# Patient Record
Sex: FEMALE | Race: WHITE | NOT HISPANIC OR LATINO
[De-identification: names, ages, dates, MRNs, and addresses within clinical notes are randomized per-mention and may not be internally consistent; named-entity substitution may affect disease eponyms.]

---

## 2018-05-23 ENCOUNTER — TRANSCRIPTION ENCOUNTER (OUTPATIENT)
Age: 38
End: 2018-05-23

## 2019-01-29 ENCOUNTER — RESULT REVIEW (OUTPATIENT)
Age: 39
End: 2019-01-29

## 2019-02-24 PROBLEM — R92.8 ABNORMAL FINDING ON BREAST IMAGING: Status: ACTIVE | Noted: 2019-02-24

## 2019-02-25 ENCOUNTER — APPOINTMENT (OUTPATIENT)
Dept: SURGICAL ONCOLOGY | Facility: CLINIC | Age: 39
End: 2019-02-25
Payer: COMMERCIAL

## 2019-02-25 VITALS
BODY MASS INDEX: 22.18 KG/M2 | RESPIRATION RATE: 18 BRPM | HEART RATE: 90 BPM | SYSTOLIC BLOOD PRESSURE: 96 MMHG | TEMPERATURE: 98.5 F | WEIGHT: 110 LBS | DIASTOLIC BLOOD PRESSURE: 67 MMHG | HEIGHT: 59 IN

## 2019-02-25 DIAGNOSIS — R92.8 OTHER ABNORMAL AND INCONCLUSIVE FINDINGS ON DIAGNOSTIC IMAGING OF BREAST: ICD-10-CM

## 2019-02-25 PROCEDURE — 99243 OFF/OP CNSLTJ NEW/EST LOW 30: CPT

## 2020-06-25 ENCOUNTER — RESULT REVIEW (OUTPATIENT)
Age: 40
End: 2020-06-25

## 2020-10-19 NOTE — REASON FOR VISIT
[Initial Consultation] : an initial consultation for [Other: _____] : [unfilled] [FreeTextEntry2] : Equivocal baseline breast imaging

## 2020-10-19 NOTE — PHYSICAL EXAM
[Normal] : supple, no neck mass and thyroid not enlarged [Normal Neck Lymph Nodes] : normal neck lymph nodes  [Normal Supraclavicular Lymph Nodes] : normal supraclavicular lymph nodes [Normal Axillary Lymph Nodes] : normal axillary lymph nodes [Normal] : normal appearance, no rash, nodules, vesicles, ulcers, erythema [de-identified] : groins not examined [de-identified] : Below

## 2020-10-19 NOTE — HISTORY OF PRESENT ILLNESS
[de-identified] : 38-year-old lady being referred by her gynecologist Dr. Avila NAVARRETE with BILATERAL BREAST abnormalities on baseline mammography and sonography at [place], 2019: BI-RADS 3.\par \par Dr. Osmin Malik's neice\par \par Noted were bilateral microcalcifications, and nodules, for which 6 month followup mammogram and ultrasound have been recommended for 2019........................\par \par She is asymptomatic.\par \par She's had no previous breast biopsies\par \par No relatives with breast or ovarian cancer.\par \par No relatives with any history of malignancy.\par \par Menarche at 14.\par  4, para 2 the first at age 32.\par +BCP(Junel)\par \par + Ashkenazi\par \par Breast cancer risk score is 13.5\par \par \par , she was evaluated by Dr. Maxx Figueroa (hepatology) for a +hepatitis C antibody, this was felt to be a "likely soft positive", and at no clinical consequence.\par \par Her internist is Dr. Mike Ibarra.\par No anticoagulants.\par Oral contraceptives as above.\par Topamax for migraines.\par No other prescription medications.\par \par Her gynecologist is Dr. Avila Navarrete\par 2019 Pap smear was normal.\par \par Baseline colonoscopy will be at age 51

## 2020-10-19 NOTE — ASSESSMENT
[FreeTextEntry1] : We discussed the microcalcifications in both breasts, thought possibly to be "milk of calcium", and the nodule identified on screening sonography.\par While noted these were felt worrisome, since her were identified on her baseline studies, 6 month followup has been recommended.\par \par I retained her imaging from [place] and submitted for internal review.\par If concordant, she will have bilateral diagnostic mammography and sonography July 2019..(Below).\par \par Breast cancer risk score is not elevated\par \par Reviewed in detail, all questions answered.\par \par We should see her in approximately 6 months, sooner if needed.\par \par Note dictated\par \par 02/26/19:\par I spoke with Dr. Rider (breast imaging).\par She agrees with the recommended six-month followup bilateral mammography and sonography in July 2019. \par I spoke with the patient, and reviewed the above impression.\par I am mailing her a prescription for repeat studies in July at [facility].\par We will see her after that, sooner if needed\par ND\par \par \par \par 9-10-20:\par She had a right breast cyst aspiration at [facility], no malignant cells on cytology.\par Right breast (10:00) core biopsy: Fibrocystic change\par Left breast (4:00) core biopsy: Epithelial hyperplasia, fibrocystic change.\par Above studies were requested by her gynecologist, Dr. Avila Navarrete

## 2021-02-28 ENCOUNTER — TRANSCRIPTION ENCOUNTER (OUTPATIENT)
Age: 41
End: 2021-02-28

## 2021-06-29 ENCOUNTER — RX RENEWAL (OUTPATIENT)
Age: 41
End: 2021-06-29

## 2021-09-20 ENCOUNTER — RX RENEWAL (OUTPATIENT)
Age: 41
End: 2021-09-20

## 2021-09-21 ENCOUNTER — ASOB RESULT (OUTPATIENT)
Age: 41
End: 2021-09-21

## 2021-09-21 ENCOUNTER — APPOINTMENT (OUTPATIENT)
Dept: OBGYN | Facility: CLINIC | Age: 41
End: 2021-09-21
Payer: COMMERCIAL

## 2021-09-21 VITALS
DIASTOLIC BLOOD PRESSURE: 70 MMHG | SYSTOLIC BLOOD PRESSURE: 120 MMHG | BODY MASS INDEX: 25.2 KG/M2 | HEIGHT: 59 IN | WEIGHT: 125 LBS

## 2021-09-21 DIAGNOSIS — Z01.419 ENCOUNTER FOR GYNECOLOGICAL EXAMINATION (GENERAL) (ROUTINE) W/OUT ABNORMAL FINDINGS: ICD-10-CM

## 2021-09-21 PROCEDURE — 99395 PREV VISIT EST AGE 18-39: CPT

## 2021-09-21 PROCEDURE — 76830 TRANSVAGINAL US NON-OB: CPT

## 2021-09-21 RX ORDER — LEVONORGESTREL AND ETHINYL ESTRADIOL AND ETHINYL ESTRADIOL 150-30(84)
0.15-0.03 &0.01 KIT ORAL
Qty: 91 | Refills: 2 | Status: ACTIVE | COMMUNITY
Start: 2021-09-21 | End: 1900-01-01

## 2021-09-22 LAB — HPV HIGH+LOW RISK DNA PNL CVX: NOT DETECTED

## 2021-09-27 LAB — CYTOLOGY CVX/VAG DOC THIN PREP: NORMAL

## 2022-04-12 ENCOUNTER — TRANSCRIPTION ENCOUNTER (OUTPATIENT)
Age: 42
End: 2022-04-12

## 2022-09-22 ENCOUNTER — APPOINTMENT (OUTPATIENT)
Dept: OBGYN | Facility: CLINIC | Age: 42
End: 2022-09-22

## 2022-11-10 ENCOUNTER — APPOINTMENT (OUTPATIENT)
Dept: OBGYN | Facility: CLINIC | Age: 42
End: 2022-11-10

## 2022-11-10 ENCOUNTER — ASOB RESULT (OUTPATIENT)
Age: 42
End: 2022-11-10

## 2022-11-10 VITALS
SYSTOLIC BLOOD PRESSURE: 116 MMHG | HEIGHT: 59 IN | DIASTOLIC BLOOD PRESSURE: 79 MMHG | WEIGHT: 130 LBS | BODY MASS INDEX: 26.21 KG/M2

## 2022-11-10 DIAGNOSIS — Z30.019 ENCOUNTER FOR INITIAL PRESCRIPTION OF CONTRACEPTIVES, UNSPECIFIED: ICD-10-CM

## 2022-11-10 DIAGNOSIS — Z01.419 ENCOUNTER FOR GYNECOLOGICAL EXAMINATION (GENERAL) (ROUTINE) W/OUT ABNORMAL FINDINGS: ICD-10-CM

## 2022-11-10 PROCEDURE — 99396 PREV VISIT EST AGE 40-64: CPT

## 2022-11-10 PROCEDURE — 82270 OCCULT BLOOD FECES: CPT

## 2022-11-10 PROCEDURE — 76830 TRANSVAGINAL US NON-OB: CPT

## 2022-11-14 LAB — HPV HIGH+LOW RISK DNA PNL CVX: NOT DETECTED

## 2022-11-17 LAB — CYTOLOGY CVX/VAG DOC THIN PREP: NORMAL

## 2023-03-18 ENCOUNTER — NON-APPOINTMENT (OUTPATIENT)
Age: 43
End: 2023-03-18

## 2023-06-30 ENCOUNTER — NON-APPOINTMENT (OUTPATIENT)
Age: 43
End: 2023-06-30

## 2023-12-12 ENCOUNTER — APPOINTMENT (OUTPATIENT)
Dept: OBGYN | Facility: CLINIC | Age: 43
End: 2023-12-12
Payer: COMMERCIAL

## 2023-12-12 ENCOUNTER — ASOB RESULT (OUTPATIENT)
Age: 43
End: 2023-12-12

## 2023-12-12 VITALS
BODY MASS INDEX: 27.82 KG/M2 | HEIGHT: 59 IN | SYSTOLIC BLOOD PRESSURE: 113 MMHG | DIASTOLIC BLOOD PRESSURE: 70 MMHG | WEIGHT: 138 LBS

## 2023-12-12 DIAGNOSIS — Z01.419 ENCOUNTER FOR GYNECOLOGICAL EXAMINATION (GENERAL) (ROUTINE) W/OUT ABNORMAL FINDINGS: ICD-10-CM

## 2023-12-12 PROCEDURE — 99396 PREV VISIT EST AGE 40-64: CPT

## 2023-12-12 PROCEDURE — 82270 OCCULT BLOOD FECES: CPT

## 2023-12-13 LAB — HPV HIGH+LOW RISK DNA PNL CVX: NOT DETECTED

## 2023-12-18 LAB — CYTOLOGY CVX/VAG DOC THIN PREP: NORMAL

## 2023-12-22 ENCOUNTER — RX RENEWAL (OUTPATIENT)
Age: 43
End: 2023-12-22

## 2024-06-25 ENCOUNTER — RX RENEWAL (OUTPATIENT)
Age: 44
End: 2024-06-25

## 2024-06-25 RX ORDER — LEVONORGESTREL AND ETHINYL ESTRADIOL AND ETHINYL ESTRADIOL 150-30(84)
0.15-0.03 &0.01 KIT ORAL DAILY
Qty: 91 | Refills: 1 | Status: ACTIVE | COMMUNITY
Start: 2021-06-29 | End: 1900-01-01

## 2024-09-23 ENCOUNTER — NON-APPOINTMENT (OUTPATIENT)
Age: 44
End: 2024-09-23

## 2024-09-24 ENCOUNTER — APPOINTMENT (OUTPATIENT)
Dept: OBGYN | Facility: CLINIC | Age: 44
End: 2024-09-24

## 2024-09-24 VITALS
SYSTOLIC BLOOD PRESSURE: 114 MMHG | DIASTOLIC BLOOD PRESSURE: 77 MMHG | BODY MASS INDEX: 21.17 KG/M2 | WEIGHT: 105 LBS | HEIGHT: 59 IN

## 2024-09-24 DIAGNOSIS — Z01.419 ENCOUNTER FOR GYNECOLOGICAL EXAMINATION (GENERAL) (ROUTINE) W/OUT ABNORMAL FINDINGS: ICD-10-CM

## 2024-09-24 PROCEDURE — 99396 PREV VISIT EST AGE 40-64: CPT

## 2024-09-24 PROCEDURE — G0444 DEPRESSION SCREEN ANNUAL: CPT | Mod: 59

## 2024-09-24 PROCEDURE — 99459 PELVIC EXAMINATION: CPT | Mod: NC

## 2024-09-24 PROCEDURE — 82270 OCCULT BLOOD FECES: CPT

## 2024-09-24 NOTE — PHYSICAL EXAM
[Chaperone Present] : A chaperone was present in the examining room during all aspects of the physical examination [[id number]] : A chaperone was present during the pelvic exam. [Appropriately responsive] : appropriately responsive [Alert] : alert [No Acute Distress] : no acute distress [No Lymphadenopathy] : no lymphadenopathy [Regular Rate Rhythm] : regular rate rhythm [No Murmurs] : no murmurs [Clear to Auscultation B/L] : clear to auscultation bilaterally [Soft] : soft [Non-tender] : non-tender [Non-distended] : non-distended [No HSM] : No HSM [No Lesions] : no lesions [No Mass] : no mass [Oriented x3] : oriented x3 [Examination Of The Breasts] : a normal appearance [No Masses] : no breast masses were palpable [Labia Majora] : normal [Labia Minora] : normal [Scant] : There was scant vaginal bleeding [Normal] : normal [Uterine Adnexae] : normal [FreeTextEntry2] : Bernardo Giron [FreeTextEntry9] : no masses. Guaiac negative.

## 2024-09-24 NOTE — PLAN
[FreeTextEntry1] : Health Maintenance: Normal gyn examination. Pap smear conducted today. Rx given for mammogram and breast sonogram. Advised pt to see PCP annually for screening laboratories. Nutrition and exercise discussed. Rx given for Levonorgest-Eth Estradiol 91-day  Mild Vaginal Atrophy:  Advised to use coconut oil as a moisturizer and lubricant   RTO PRN or for annual gyn exam

## 2024-09-24 NOTE — HISTORY OF PRESENT ILLNESS
[FreeTextEntry1] : CC: Annual Wellness Visit 43 year old patient  LMP today. Presents today for an annual wellness visit.  Patient offers no complaints. No abnormal vaginal bleeding, pain, or vaginal discharge. Denies changes in medical status, medications, serious illness, hospitalizations, and surgeries. Reviewed last mammogram from 2023. Due 2024.  Reviewed last pelvic sonogram from 2023.  Followed yearly by PCP for screening laboratories. Reviewed patient's current medications. Pt reports mild vaginal dryness.  PMH PCOS PSH C/S x2 OBhx chemical pregnancy x2, c/s x2 w/ Anabel and Alcides Fhx Denies breast, ovarian, uterine, or colon ca Meds Topamax, Levonorgest-Eth Estradiol 91-day

## 2024-09-25 LAB — HPV HIGH+LOW RISK DNA PNL CVX: NOT DETECTED

## 2024-09-30 LAB — CYTOLOGY CVX/VAG DOC THIN PREP: NORMAL

## 2024-10-21 ENCOUNTER — APPOINTMENT (OUTPATIENT)
Dept: OBGYN | Facility: CLINIC | Age: 44
End: 2024-10-21

## 2024-11-26 ENCOUNTER — NON-APPOINTMENT (OUTPATIENT)
Age: 44
End: 2024-11-26

## 2025-07-29 DIAGNOSIS — Z12.39 ENCOUNTER FOR OTHER SCREENING FOR MALIGNANT NEOPLASM OF BREAST: ICD-10-CM

## 2025-07-29 DIAGNOSIS — R92.30 DENSE BREASTS, UNSPECIFIED: ICD-10-CM
